# Patient Record
(demographics unavailable — no encounter records)

---

## 2025-05-12 NOTE — DISCUSSION/SUMMARY
[Normal Growth] : growth [None] : No known medical problems [No Elimination Concerns] : elimination [No Feeding Concerns] : feeding [No Skin Concerns] : skin [Normal Sleep Pattern] : sleep [Family Support] : family support [Encouraging Literacy Activities] : encouraging literacy activities [Playing with Peers] : playing with peers [Promoting Physical Activity] : promoting physical activity [Safety] : safety [No Medications] : ~He/She~ is not on any medications [Parent/Guardian] : parent/guardian [FreeTextEntry1] : 2yo here for well visit no concerns discussed nutrition and development PVF sent, brush teeth, routine dental visits  RTO in 1 year for well visit or sooner PRN

## 2025-05-12 NOTE — HISTORY OF PRESENT ILLNESS
[FreeTextEntry1] : 4yo here for well visit gets speech and special instruction 2x a week  going to [employer] program in sept  very picky eater drinks water, milk  normal urine/BM sleeps well  brushing teeth, PVF   speaking well now, some sentences  not toilet trained- will need to be for

## 2025-05-12 NOTE — PHYSICAL EXAM
